# Patient Record
Sex: FEMALE | Race: AMERICAN INDIAN OR ALASKA NATIVE
[De-identification: names, ages, dates, MRNs, and addresses within clinical notes are randomized per-mention and may not be internally consistent; named-entity substitution may affect disease eponyms.]

---

## 2017-09-17 NOTE — EDM.PDOC
ED HPI GENERAL MEDICAL PROBLEM





- General


Chief Complaint: Abdominal Pain


Stated Complaint: [id number] PAIN IN STOMACH SWELLING


Time Seen by Provider: 09/17/17 13:00


Source of Information: Reports: Patient


History Limitations: Reports: No Limitations





- History of Present Illness


INITIAL COMMENTS - FREE TEXT/NARRATIVE: 





56 yo Native Female c/o epigastric pain w/ nausea X 3 days.  Previous symptoms 

3 years ago and spontaneously resolved.  No Diarrhea


Onset Date: 09/15/17


Onset Time: 12:00


Duration: Day(s):, Getting Worse


Location: Reports: Abdomen (epigastric area)


Quality: Reports: Burning


Severity: Moderate


Improves with: Reports: None


Worsens with: Reports: Eating


Context: Reports: Other (Pt. states previous symptoms 3 years ago)


Associated Symptoms: Reports: Nausea/Vomiting


  ** Epigastric


Pain Score (Numeric/FACES): 8





- Related Data


 Allergies











Allergy/AdvReac Type Severity Reaction Status Date / Time


 


codeine Allergy  Itching Verified 02/15/16 21:17











Home Meds: 


 Home Meds





Fluticasone Propionate [Flonase] 1 - 2 sprays NASBOTH ASDIRECTED PRN 09/17/17 [

History]


Ibuprofen 1 tab PO ASDIRECTED PRN 09/17/17 [History]











Past Medical History


HEENT History: Reports: Impaired Vision


Respiratory History: Reports: Asthma


Gastrointestinal History: Reports: GERD


Genitourinary History: Reports: UTI, Recurrent


OB/GYN History: Reports: Pregnancy





- Infectious Disease History


Infectious Disease History: Reports: Chicken Pox





Social & Family History





- Tobacco Use


Smoking Status *Q: Never Smoker


Second Hand Smoke Exposure: No





- Recreational Drug Use


Recreational Drug Use: No





ED ROS GENERAL





- Review of Systems


Review Of Systems: See Below


Constitutional: Reports: No Symptoms


HEENT: Reports: No Symptoms


Respiratory: Reports: No Symptoms


Cardiovascular: Reports: No Symptoms


Endocrine: Reports: No Symptoms


GI/Abdominal: Reports: Nausea


: Reports: No Symptoms


Musculoskeletal: Reports: No Symptoms


Skin: Reports: No Symptoms


Neurological: Reports: No Symptoms


Psychiatric: Reports: No Symptoms


Hematologic/Lymphatic: Reports: No Symptoms


Immunologic: Reports: No Symptoms





ED EXAM, GI/ABD





- Physical Exam


Exam: See Below


Exam Limited By: No Limitations


General Appearance: Alert, WD/WN, No Apparent Distress


Eyes: Bilateral: EOMI


Ears: Normal External Exam


Nose: Normal Inspection


Throat/Mouth: Normal Inspection, Normal Oropharynx


Head: Atraumatic


Neck: Normal Inspection


Respiratory/Chest: No Respiratory Distress, Lungs Clear


Cardiovascular: Normal Peripheral Pulses


GI/Abdominal Exam: Soft, No Organomegaly, No Distention, No Abnormal Bruit, No 

Mass, Other (epigatric tenderness)


Back Exam: Normal Inspection


Extremities: Normal Inspection, Normal Range of Motion


Neurological: Alert, Oriented, CN II-XII Intact


Psychiatric: Normal Affect, Normal Mood


Skin Exam: Warm, Dry, Intact


Lymphatic: No Adenopathy





Course





- Vital Signs


Last Recorded V/S: 


 Last Vital Signs











Temp  36.4 C   09/17/17 12:55


 


Pulse  61   09/17/17 12:55


 


Resp  18   09/17/17 12:55


 


BP  145/68 H  09/17/17 12:55


 


Pulse Ox  100   09/17/17 12:55














- Orders/Labs/Meds


Meds: 


Medications














Discontinued Medications














Generic Name Dose Route Start Last Admin





  Trade Name Freq  PRN Reason Stop Dose Admin


 


Al Hydroxide/Mg Hydroxide  30 ml  09/17/17 13:05  09/17/17 13:11





  Gi Cocktail  PO  09/17/17 13:06  30 ml





  ONETIME ONE   Administration


 


Famotidine  20 mg  09/17/17 13:05  09/17/17 13:11





  Pepcid  PO  09/17/17 13:06  20 mg





  ONETIME ONE   Administration














Departure





- Departure


Time of Disposition: 13:36


Disposition: Home, Self-Care 01


Condition: Good


Clinical Impression: 


 Gastroesophagitis





Allergic rhinitis


Qualifiers:


 Chronicity: chronic Allergic rhinitis trigger: unspecified Allergic rhinitis 

seasonality: unspecified seasonality Qualified Code(s): J30.9 - Allergic 

rhinitis, unspecified








- Discharge Information


Forms:  ED Department Discharge


Additional Instructions: 


Increase intake of Fluids ( Water / Juice)





Try a BLAND DIET





Take the medication as prescribed ( PRILOSEC OTC) Take one each day  # 30





Take the FLONASE nasal spray 2 puffs each nostril QD  #1





F/U w/ PCP for re-evaluation for possible referral for Upper Endoscopy

## 2018-07-08 NOTE — EDM.PDOC
ED HPI GENERAL MEDICAL PROBLEM





- General


Chief Complaint: Back Pain or Injury


Stated Complaint: LOWER BACK AND ABD PAIN [id number]


Time Seen by Provider: 07/07/18 23:44


Source of Information: Reports: Patient, RN, RN Notes Reviewed


History Limitations: Reports: No Limitations





- History of Present Illness


INITIAL COMMENTS - FREE TEXT/NARRATIVE: 


Pt to Er with c/o pain in the LLQ wrapping around to the left side of the lower 

back. Patient states she has recently been on abx for a UTI. SHe states she has 

not had any problems with bowel movements or urination. Denies fever/chills, N/V

/D, blood in the urine. Denies hx of kidney stones. Rates the pain 5/10. More 

pain with movement. She states she has recently been moving and lifting heavy 

things. 


Onset: Gradual


  ** Left Lower Back


Pain Score (Numeric/FACES): 8





- Related Data


 Allergies











Allergy/AdvReac Type Severity Reaction Status Date / Time


 


codeine Allergy  Itching Verified 02/15/16 21:17











Home Meds: 


 Home Meds





Fluticasone Propionate [Flonase] 1 - 2 sprays NASBOTH ASDIRECTED PRN 09/17/17 [

History]


Ibuprofen 1 tab PO ASDIRECTED PRN 09/17/17 [History]











Past Medical History


HEENT History: Reports: Impaired Vision


Other HEENT History: wears glasses


Cardiovascular History: Reports: None


Respiratory History: Reports: Asthma


Gastrointestinal History: Reports: GERD


Genitourinary History: Reports: UTI, Recurrent


OB/GYN History: Reports: Pregnancy


Musculoskeletal History: Reports: None


Neurological History: Reports: None


Psychiatric History: Reports: None


Endocrine/Metabolic History: Reports: None


Immunologic History: Reports: None


Oncologic (Cancer) History: Reports: None


Dermatologic History: Reports: None





- Infectious Disease History


Infectious Disease History: Reports: Chicken Pox





Social & Family History





- Tobacco Use


Smoking Status *Q: Former Smoker


Used Tobacco, but Quit: Yes


Month/Year Tobacco Last Used: 11/2014





- Caffeine Use


Caffeine Use: Reports: Coffee





ED ROS GENERAL





- Review of Systems


Review Of Systems: ROS reveals no pertinent complaints other than HPI.





ED EXAM,LOWER BACK PAIN/INJURY





- Physical Exam


Exam: See Below


Exam Limited By: No Limitations


General Appearance: Alert, WD/WN, Mild Distress


Eye Exam: Bilateral Eye: EOMI, Normal Inspection


Ears: Normal External Exam, Hearing Grossly Normal


Nose: Normal Inspection


Throat/Mouth: Normal Inspection, Normal Voice, No Airway Compromise


Head: Atraumatic, Normocephalic


Neck: Normal Inspection, Supple, Non-Tender, Full Range of Motion


Respiratory/Chest: No Respiratory Distress, Lungs Clear, Normal Breath Sounds, 

No Accessory Muscle Use, Chest Non-Tender


Cardiovascular: Normal Peripheral Pulses, Regular Rate, Rhythm, No Edema, No 

Gallop, No JVD, No Murmur, No Rub


GI/Abdominal: Normal Bowel Sounds, Soft, Non-Tender, No Organomegaly, No 

Distention, No Abnormal Bruit, No Mass, Pelvis Stable


 (Female) Exam: Deferred


Rectal (Female) Exam: Deferred


Back Exam: Normal Inspection, Full Range of Motion


Extremities: Normal Inspection, Non-Tender, No Pedal Edema, Normal Capillary 

Refill, Limited Range of Motion (legs bilat)


Neurological: Alert, Normal Mood/Affect, Normal Dorsiflexion, CN II-XII Intact, 

Normal Plantar Flexion, Normal Gait, Normal Reflexes, No Motor/Sensory Deficits

, Oriented x 3


Psychiatric: Normal Affect, Normal Mood


Skin Exam: Warm, Dry, Intact, Normal Color, No Rash


Lymphatic: No Adenopathy





Course





- Vital Signs


Last Recorded V/S: 


 Last Vital Signs











Temp  98.0 F   07/07/18 22:31


 


Pulse  70   07/07/18 22:31


 


Resp  16   07/07/18 22:31


 


BP  114/95 H  07/07/18 22:31


 


Pulse Ox  100   07/07/18 22:31














- Orders/Labs/Meds


Orders: 


 Active Orders 24 hr











 Category Date Time Status


 


 URINALYSIS W/MICROSCOPIC [UA W/MICROSCOPIC] [URIN] Stat Lab  07/07/18 22:51 

Ordered











Labs: 


 Laboratory Tests











  07/07/18 Range/Units





  22:51 


 


Urine Color  Yellow  (YELLOW)  


 


Urine Appearance  Clear  (CLEAR)  


 


Urine pH  5.5  (5.0-9.0)  


 


Ur Specific Gravity  1.010  (1.005-1.030)  


 


Urine Protein  Negative  (NEGATIVE)  


 


Urine Glucose (UA)  Negative  (NEGATIVE)  


 


Urine Ketones  Negative  (NEGATIVE)  


 


Urine Occult Blood  Negative  (NEGATIVE)  


 


Urine Nitrite  Negative  (NEGATIVE)  


 


Urine Bilirubin  Negative  (NEGATIVE)  


 


Urine Urobilinogen  0.2  (0.2-1.0)  mg/dL


 


Ur Leukocyte Esterase  Negative  (NEGATIVE)  


 


Urine RBC  0-5  /HPF


 


Urine WBC  0-5  (0-5/HPF)  /HPF


 


Ur Epithelial Cells  Few  /HPF


 


Urine Bacteria  Few  (0-FEW/HPF)  /HPF











Meds: 


Medications














Discontinued Medications














Generic Name Dose Route Start Last Admin





  Trade Name Dipti  PRN Reason Stop Dose Admin


 


Ketorolac Tromethamine  60 mg  07/07/18 23:50  07/08/18 00:06





  Toradol  IM  07/07/18 23:51  60 mg





  ONETIME ONE   Administration





     





     





     





     


 


Orphenadrine Citrate  60 mg  07/07/18 23:45  07/08/18 00:06





  Norflex  IM   60 mg





  Q12H SELAM   Administration





     





     





     





     














Departure





- Departure


Time of Disposition: 00:39


Disposition: Home, Self-Care 01


Condition: Fair


Clinical Impression: 


 Muscle strain








- Discharge Information


Instructions:  Back Injury Prevention, Easy-to-Read, Muscle Strain, Easy-to-Read

, Back Pain, Adult, Easy-to-Read


Forms:  ED Department Discharge


Additional Instructions: 


Use Heat and ice as tolerated to the area


Rest, no heavy lifting


RX: Norflex


May use ibuprofen as directed for pain


Follow up with your primary care facility if no improvement








- My Orders


Last 24 Hours: 


My Active Orders





07/07/18 22:51


URINALYSIS W/MICROSCOPIC [UA W/MICROSCOPIC] [URIN] Stat 














- Assessment/Plan


Last 24 Hours: 


My Active Orders





07/07/18 22:51


URINALYSIS W/MICROSCOPIC [UA W/MICROSCOPIC] [URIN] Stat

## 2019-09-01 NOTE — EDM.PDOC
ED HPI GENERAL MEDICAL PROBLEM





- General


Chief Complaint: Respiratory Problem


Stated Complaint: BRONCHITIS, BAD SINUS INFECTION


Time Seen by Provider: 09/01/19 23:05


Source of Information: Reports: Patient


History Limitations: Reports: No Limitations





- History of Present Illness


INITIAL COMMENTS - FREE TEXT/NARRATIVE: 





This 58 yo female patient reports to the ED with a 1 month history of sinus 

congestion and increased cough. The patient reports she was seen about 2 weeks 

ago at the [facility] for a sinus infection, started on a 5 day course 

of antibiotics, finished the antibiotics, but has not been feeling any better. 

The patient reports she started noticing an increased cough 2 days ago with 

increasing shortness of breath. The patient reports she has been taking OTC 

medications, but continues to feel worse. 


Onset Date: 08/30/19


Duration: Constant, Getting Worse


Location: Reports: Head, Chest


Quality: Reports: Other


Severity: Moderate


Improves with: Reports: None


Worsens with: Reports: None


Context: Reports: Other


Associated Symptoms: Reports: Cough, Shortness of Breath


  ** Generalized


Pain Score (Numeric/FACES): 5





- Related Data


 Allergies











Allergy/AdvReac Type Severity Reaction Status Date / Time


 


codeine Allergy  Itching Verified 09/01/19 23:18


 


Penicillins Allergy  Cannot Verified 09/01/19 23:19





   Remember  











Home Meds: 


 Home Meds





Fluticasone Propionate [Flonase] 1 - 2 sprays NASBOTH ASDIRECTED PRN 09/17/17 [

History]


Ibuprofen 1 tab PO ASDIRECTED PRN 09/17/17 [History]











Past Medical History


HEENT History: Reports: Impaired Vision


Other HEENT History: wears glasses


Cardiovascular History: Reports: None


Respiratory History: Reports: Asthma


Gastrointestinal History: Reports: GERD


Genitourinary History: Reports: UTI, Recurrent


OB/GYN History: Reports: Pregnancy


Musculoskeletal History: Reports: None


Neurological History: Reports: None


Psychiatric History: Reports: None


Endocrine/Metabolic History: Reports: None


Immunologic History: Reports: None


Oncologic (Cancer) History: Reports: None


Dermatologic History: Reports: None





- Infectious Disease History


Infectious Disease History: Reports: Chicken Pox





Social & Family History





- Tobacco Use


Smoking Status *Q: Never Smoker


Second Hand Smoke Exposure: No





- Caffeine Use


Caffeine Use: Reports: Coffee, Soda, Tea





- Recreational Drug Use


Recreational Drug Use: No





ED ROS GENERAL





- Review of Systems


Review Of Systems: ROS reveals no pertinent complaints other than HPI.





ED EXAM, GENERAL





- Physical Exam


Exam: See Below


Exam Limited By: No Limitations


General Appearance: Alert, WD/WN, Mild Distress


Eye Exam: Bilateral Eye: EOMI, Normal Inspection, PERRL


Ears: Normal External Exam, Normal Canal, Hearing Grossly Normal, Normal TMs


Nose: Normal Inspection, Normal Mucosa, No Blood


Throat/Mouth: Normal Inspection, Normal Lips, Normal Teeth, Normal Gums, Other


Head: Atraumatic, Normocephalic


Neck: Normal Inspection, Supple, Non-Tender, Full Range of Motion


Respiratory/Chest: No Respiratory Distress, Lungs Clear, Normal Breath Sounds, 

No Accessory Muscle Use, Chest Non-Tender


Cardiovascular: Normal Peripheral Pulses, Regular Rate, Rhythm, No Edema, No 

Gallop, No JVD, No Murmur, No Rub


GI/Abdominal: Normal Bowel Sounds, Soft, Non-Tender, No Organomegaly, No 

Distention, No Abnormal Bruit, No Mass


 (Female) Exam: Deferred


Rectal (Female) Exam: Deferred


Back Exam: Normal Inspection, Full Range of Motion, NT


Extremities: Normal Inspection, Normal Range of Motion, Non-Tender, Normal 

Capillary Refill, No Pedal Edema


Neurological: Alert, Oriented, CN II-XII Intact, Normal Cognition, Normal Gait, 

Normal Reflexes, No Motor/Sensory Deficits


Psychiatric: Normal Affect, Normal Mood


Skin Exam: Warm, Dry, Intact, Normal Color, No Rash


Lymphatic: No Adenopathy





Course





- Vital Signs


Last Recorded V/S: 


 Last Vital Signs











Temp  35.9 C   09/01/19 23:04


 


Pulse  70   09/01/19 23:04


 


Resp  20   09/01/19 23:04


 


BP  133/74   09/01/19 23:04


 


Pulse Ox  99   09/01/19 23:04














- Orders/Labs/Meds


Orders: 


 Active Orders 24 hr











 Category Date Time Status


 


 Chest 2V [CR] Urgent Exams  09/01/19 23:07 Ordered


 


 CULTURE BLOOD [BC] Stat Lab  09/01/19 23:07 Ordered


 


 CULTURE STREP A CONFIRMATION [RM] Stat Lab  09/01/19 23:08 Results


 


 STREP SCRN A RAPID W CULT CONF [RM] Stat Lab  09/01/19 23:08 Ordered











Labs: 


 Laboratory Tests











  09/01/19 09/01/19 09/01/19 Range/Units





  23:14 23:14 23:14 


 


WBC   5.8   (5.0-10.0)  10^3/uL


 


RBC   4.38   (4.2-5.4)  10^6/uL


 


Hgb   13.1   (12.0-16.0)  g/dL


 


Hct   41.1   (37.0-47.0)  %


 


MCV   93.8   ()  fL


 


MCH   29.9   (27.0-34.0)  pg


 


MCHC   31.9 L   (33.0-35.0)  g/dL


 


Plt Count   257   (150-450)  10^3/uL


 


Neut % (Auto)   39.0 L   (42.2-75.2)  %


 


Lymph % (Auto)   43.1   (20.5-50.1)  %


 


Mono % (Auto)   12.4 H   (2-8)  %


 


Eos % (Auto)   5.2 H   (1.0-3.0)  %


 


Baso % (Auto)   0.3   (0.0-1.0)  %


 


Sodium    138  (135-145)  mmol/L


 


Potassium    3.8  (3.6-5.0)  mmol/L


 


Chloride    103  (101-111)  mmol/L


 


Carbon Dioxide    28.0  (21.0-31.0)  mmol/L


 


Anion Gap    10.8  


 


BUN    12  (7-18)  mg/dL


 


Creatinine    0.7  (0.6-1.3)  mg/dL


 


Est Cr Clr Drug Dosing    76.90  mL/min


 


Estimated GFR (MDRD)    > 60  


 


BUN/Creatinine Ratio    17.14  


 


Glucose    106 H  ()  mg/dL


 


Lactic Acid  1.1    (0.5-2.2)  mmol/L


 


Calcium    8.8  (8.4-10.2)  mg/dl


 


Total Bilirubin    0.6  (0.2-1.0)  mg/dL


 


AST    22  (10-42)  IU/L


 


ALT    23  (10-60)  IU/L


 


Alkaline Phosphatase    79  ()  IU/L


 


Total Protein    7.7  (6.7-8.2)  g/dl


 


Albumin    4.0  (3.2-5.5)  g/dl


 


Globulin    3.7  


 


Albumin/Globulin Ratio    1.08  














Departure





- Departure


Time of Disposition: 23:45


Disposition: Home, Self-Care 01


Condition: Fair


Clinical Impression: 


 Viral upper respiratory tract infection with cough








- Discharge Information


*PRESCRIPTION DRUG MONITORING PROGRAM REVIEWED*: Not Applicable


*COPY OF PRESCRIPTION DRUG MONITORING REPORT IN PATIENT АНДРЕЙ: Not Applicable


Instructions:  Viral Respiratory Infection, Easy-To-Read


Forms:  ED Department Discharge


Care Plan Goals: 


The patient was advised of the examination, lab and x-ray results during the 

visit. The patient was encouraged to continue to use over-the-counter 

medications for temporary symptom relief. If the patient has any additional 

symptoms or concerns, the patient should either return to the emergency 

department or visit her primary care facility. 





- My Orders


Last 24 Hours: 


My Active Orders





09/01/19 23:07


Chest 2V [CR] Urgent 


CULTURE BLOOD [BC] Stat 





09/01/19 23:08


CULTURE STREP A CONFIRMATION [RM] Stat 


STREP SCRN A RAPID W CULT CONF [RM] Stat 














- Assessment/Plan


Last 24 Hours: 


My Active Orders





09/01/19 23:07


Chest 2V [CR] Urgent 


CULTURE BLOOD [BC] Stat 





09/01/19 23:08


CULTURE STREP A CONFIRMATION [RM] Stat 


STREP SCRN A RAPID W CULT CONF [RM] Stat

## 2021-06-09 NOTE — EDM.PDOC
ED HPI GENERAL MEDICAL PROBLEM





- General


Chief Complaint: ENT Problem


Stated Complaint: SINUS INFECTION


Time Seen by Provider: 06/09/21 21:45


Source of Information: Reports: Patient


History Limitations: Reports: No Limitations





- History of Present Illness


INITIAL COMMENTS - FREE TEXT/NARRATIVE: 





This 60 yo female patient reports to the ED due to sinus congestion and 

pressure. The patient reports she was seen in the [facility] 2 weeks ago

for similar symptoms was started on Doxycycline (5 days) and Alavert. The 

patient reports her symptoms initially got better, but returned again. The 

patient reports her symptoms started to get much worse on Saturday 

(lightheadedness). The patient reports she did call the clinic yesterday and 

today, but there were no appointments available. 


Onset Date: 06/05/21


Duration: Constant, Getting Worse


Location: Reports: Head


Quality: Reports: Ache, Pressure


Severity: Moderate


Improves with: Reports: None


Worsens with: Reports: None


Context: Reports: Other


Associated Symptoms: Reports: No Other Symptoms


Treatments PTA: Reports: Acetaminophen, NSAIDS


  ** Headache


Pain Score (Numeric/FACES): 8





- Related Data


                                    Allergies











Allergy/AdvReac Type Severity Reaction Status Date / Time


 


codeine Allergy  Itching Verified 06/09/21 21:08


 


Penicillins Allergy  Cannot Verified 06/09/21 21:08





   Remember  











Home Meds: 


                                    Home Meds





Fluticasone Propionate [Flonase] 1 - 2 sprays NASBOTH ASDIRECTED PRN 09/17/17 

[History]


Ibuprofen 1 tab PO ASDIRECTED PRN 09/17/17 [History]











Past Medical History


HEENT History: Reports: Impaired Vision


Other HEENT History: wears glasses


Cardiovascular History: Reports: None


Respiratory History: Reports: Asthma


Gastrointestinal History: Reports: GERD


Genitourinary History: Reports: UTI, Recurrent


OB/GYN History: Reports: Pregnancy


Musculoskeletal History: Reports: None


Neurological History: Reports: None


Psychiatric History: Reports: None


Endocrine/Metabolic History: Reports: None


Immunologic History: Reports: None


Oncologic (Cancer) History: Reports: None


Dermatologic History: Reports: None





- Infectious Disease History


Infectious Disease History: Reports: Chicken Pox





- Past Surgical History


Head Surgeries/Procedures: Reports: None





Social & Family History





- Tobacco Use


Tobacco Use Status *Q: Never Tobacco User





- Caffeine Use


Caffeine Use: Reports: Coffee





- Recreational Drug Use


Recreational Drug Use: No





ED ROS ENT





- Review of Systems


Review Of Systems: Comprehensive ROS is negative, except as noted in HPI.





ED EXAM, ENT





- Physical Exam


Exam: See Below


Exam Limited By: No Limitations


General Appearance: Alert, WD/WN, Moderate Distress


Eye Exam: Bilateral Eye: EOMI, Normal Inspection, PERRL


Ears: Normal External Exam, Normal Canal, Normal TMs, Auricular Ecchymosis


Mouth/Throat: Normal Inspection, Normal Gums, Normal Lips, Normal Oropharynx


Head: Sinus Tenderness


Neck: Normal Inspection, Supple, Non-Tender, Full Range of Motion


Respiratory/Chest: No Respiratory Distress, Lungs Clear, Normal Breath Sounds, 

No Accessory Muscle Use, Chest Non-Tender


Cardiovascular: Normal Peripheral Pulses, Regular Rate, Rhythm, No Edema, No 

Gallop, No JVD, No Murmur, No Rub


GI/Abdominal: Normal Bowel Sounds, Soft, Non-Tender, No Organomegaly, No 

Distention, No Abnormal Bruit, No Mass


 (Female) Exam: Deferred


Rectal (Female) Exam: Deferred


Back: Normal Inspection, Full Range of Motion


Extremities: Normal Inspection, Normal Range of Motion, Non-Tender, No Pedal 

Edema, Normal Capillary Refill


Neurological: Alert, Oriented, CN II-XII Intact, Normal Cognition, Normal Gait, 

Normal Reflexes, No Motor/Sensory Deficits


Psychiatric: Normal Affect, Normal Mood


Skin: Warm, Dry, Intact, Normal Color, No Rash


Lymphatic: No Adenopathy





Course





- Vital Signs


Last Recorded V/S: 





                                Last Vital Signs











Temp  97.0 F   06/09/21 19:55


 


Pulse  67   06/09/21 19:55


 


Resp  20   06/09/21 19:55


 


BP  139/81   06/09/21 19:55


 


Pulse Ox  100   06/09/21 19:55














- Orders/Labs/Meds


Orders: 





                               Active Orders 24 hr











 Category Date Time Status


 


 CULTURE STREP A CONFIRMATION [RM] Stat Lab  06/09/21 21:25 Results


 


 STREP SCRN A RAPID W CULT CONF [RM] Stat Lab  06/09/21 21:25 Results











Meds: 





Medications














Discontinued Medications














Generic Name Dose Route Start Last Admin





  Trade Name Freq  PRN Reason Stop Dose Admin


 


Cephalexin  500 mg  06/09/21 21:56 





  Cephalexin 500 Mg Cap  PO  06/09/21 21:57 





  ONETIME ONE  


 


Ketorolac Tromethamine  30 mg  06/09/21 21:56 





  Ketorolac 30 Mg/Ml Sdv  IM  06/09/21 21:57 





  ONETIME ONE  














Departure





- Departure


Time of Disposition: 22:03


Disposition: Home, Self-Care 01


Condition: Fair


Clinical Impression: 


Sinusitis


Qualifiers:


 Sinusitis location: frontal Chronicity: acute Recurrence: recurrent Qualified 

Code(s): J01.11 - Acute recurrent frontal sinusitis








- Discharge Information


*PRESCRIPTION DRUG MONITORING PROGRAM REVIEWED*: Not Applicable


*COPY OF PRESCRIPTION DRUG MONITORING REPORT IN PATIENT АНДРЕЙ: Not Applicable


Instructions:  Sinusitis, Adult, Easy-to-Read


Care Plan Goals: 


The patient was advised of the examination results during the visit. The patient

 was given an injection of Toradol and an oral dose of Keflex while in the ED. 

The patient was discharged with a script for Keflex (500 mg) #30 to take 1 by 

mouth 3 times per day for 10 days. If the patient has any additional symptoms or

 concerns, the patient should either return to the emergency department or visit

 her primary care facility. 





Sepsis Event Note (ED)





- Evaluation


Sepsis Screening Result: No Definite Risk





- Focused Exam


Vital Signs: 





                                   Vital Signs











  Temp Pulse Resp BP Pulse Ox


 


 06/09/21 19:55  97.0 F  67  20  139/81  100














- My Orders


Last 24 Hours: 





My Active Orders





06/09/21 21:25


CULTURE STREP A CONFIRMATION [RM] Stat 


STREP SCRN A RAPID W CULT CONF [RM] Stat 














- Assessment/Plan


Last 24 Hours: 





My Active Orders





06/09/21 21:25


CULTURE STREP A CONFIRMATION [RM] Stat 


STREP SCRN A RAPID W CULT CONF [RM] Stat

## 2022-12-13 ENCOUNTER — HOSPITAL ENCOUNTER (EMERGENCY)
Dept: HOSPITAL 43 - DL.ED | Age: 62
Discharge: HOME | End: 2022-12-13
Payer: COMMERCIAL

## 2022-12-13 VITALS — SYSTOLIC BLOOD PRESSURE: 160 MMHG | DIASTOLIC BLOOD PRESSURE: 92 MMHG | HEART RATE: 78 BPM

## 2022-12-13 DIAGNOSIS — Z88.0: ICD-10-CM

## 2022-12-13 DIAGNOSIS — J45.909: ICD-10-CM

## 2022-12-13 DIAGNOSIS — S61.211A: Primary | ICD-10-CM
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
## 2022-12-13 DIAGNOSIS — Z88.5: ICD-10-CM

## 2022-12-13 DIAGNOSIS — Z79.899: ICD-10-CM
